# Patient Record
Sex: MALE | Race: WHITE | URBAN - METROPOLITAN AREA
[De-identification: names, ages, dates, MRNs, and addresses within clinical notes are randomized per-mention and may not be internally consistent; named-entity substitution may affect disease eponyms.]

---

## 2019-04-11 ENCOUNTER — OUTPATIENT (OUTPATIENT)
Dept: OUTPATIENT SERVICES | Facility: HOSPITAL | Age: 64
LOS: 1 days | End: 2019-04-11
Payer: MEDICARE

## 2019-04-11 ENCOUNTER — RESULT REVIEW (OUTPATIENT)
Age: 64
End: 2019-04-11

## 2019-04-11 DIAGNOSIS — Z86.010 PERSONAL HISTORY OF COLONIC POLYPS: ICD-10-CM

## 2019-04-11 PROCEDURE — 88312 SPECIAL STAINS GROUP 1: CPT

## 2019-04-11 PROCEDURE — 43239 EGD BIOPSY SINGLE/MULTIPLE: CPT

## 2019-04-11 PROCEDURE — 88312 SPECIAL STAINS GROUP 1: CPT | Mod: 26

## 2019-04-11 PROCEDURE — 45380 COLONOSCOPY AND BIOPSY: CPT | Mod: PT

## 2019-04-11 PROCEDURE — 88305 TISSUE EXAM BY PATHOLOGIST: CPT | Mod: 26

## 2019-04-11 PROCEDURE — 88305 TISSUE EXAM BY PATHOLOGIST: CPT

## 2019-04-15 LAB — SURGICAL PATHOLOGY STUDY: SIGNIFICANT CHANGE UP

## 2020-05-20 ENCOUNTER — TRANSCRIPTION ENCOUNTER (OUTPATIENT)
Age: 65
End: 2020-05-20

## 2020-05-28 ENCOUNTER — APPOINTMENT (OUTPATIENT)
Dept: OTOLARYNGOLOGY | Facility: CLINIC | Age: 65
End: 2020-05-28
Payer: MEDICARE

## 2020-05-28 VITALS
TEMPERATURE: 98.1 F | HEART RATE: 76 BPM | BODY MASS INDEX: 25.33 KG/M2 | DIASTOLIC BLOOD PRESSURE: 99 MMHG | SYSTOLIC BLOOD PRESSURE: 185 MMHG | WEIGHT: 187 LBS | HEIGHT: 72 IN

## 2020-05-28 PROCEDURE — 99204 OFFICE O/P NEW MOD 45 MIN: CPT | Mod: 25

## 2020-05-28 PROCEDURE — 92557 COMPREHENSIVE HEARING TEST: CPT

## 2020-05-28 PROCEDURE — 92567 TYMPANOMETRY: CPT

## 2020-05-28 NOTE — REVIEW OF SYSTEMS
[Ear Pain] : ear pain [Dizziness] : dizziness [Ear Itch] : ear itch [Vertigo] : vertigo [Ear Noises] : ear noises [Lightheadedness] : lightheadedness [Nasal Congestion] : nasal congestion [Sinus Pain] : sinus pain [Problem Snoring] : problem snoring [Sinus Pressure] : sinus pressure [Throat Dryness] : throat dryness [Snoring With Pauses] : snoring with pauses [Throat Itching] : throat itching [Eye Pain] : eye pain [Heartburn] : heartburn [Eyes Itch] : itching of the eyes [Negative] : Heme/Lymph

## 2020-05-28 NOTE — HISTORY OF PRESENT ILLNESS
[de-identified] : c/o problem with balance for past one week.  No spinning .  Felt off balance.  Seen at [facility] and admitted and discharged one day later. Had MRI - non contrast  and MRA of neck - no contrast - told negative.  Patient improving now.  No blackout or diplopia.  Also has some difficulty with vision - worse in left eye.  Occ feels flutter in ear on left.  No change in hearing.  No hx of chronic ear problems.  No recent URI.  Neg eval for COVID. \par No neuro eval.  Cardiac eval 2 years ago.  Does have elevated BP.  Patient also is scheduled for MRA of brain in near future.  He also has hx of occ severe headaches.

## 2020-05-28 NOTE — ASSESSMENT
[FreeTextEntry1] : Patient with recent onset of balance problems one week ago.   Eval to date negative including non contrast MRI and MRA of neck.  Does have hx of migraines in past.  Exam unremarkable and audio shows symmetric hfsnhl.   Feel problem may be vestibular neuronitis but may be migraine variant.  Also would have patient evaluated for elevated BP and recommended cardiac and neurology eval.  Follow up in 3 mos and as necessary.

## 2020-05-30 ENCOUNTER — APPOINTMENT (OUTPATIENT)
Dept: OTOLARYNGOLOGY | Facility: CLINIC | Age: 65
End: 2020-05-30
Payer: MEDICARE

## 2020-05-30 VITALS
SYSTOLIC BLOOD PRESSURE: 128 MMHG | BODY MASS INDEX: 25.33 KG/M2 | HEART RATE: 73 BPM | DIASTOLIC BLOOD PRESSURE: 84 MMHG | HEIGHT: 72 IN | WEIGHT: 187 LBS | TEMPERATURE: 98.6 F

## 2020-05-30 DIAGNOSIS — H90.3 SENSORINEURAL HEARING LOSS, BILATERAL: ICD-10-CM

## 2020-05-30 PROCEDURE — 99214 OFFICE O/P EST MOD 30 MIN: CPT

## 2020-06-01 PROBLEM — H90.3 SENSORINEURAL HEARING LOSS (SNHL) OF BOTH EARS: Status: ACTIVE | Noted: 2020-05-28

## 2020-06-01 NOTE — HISTORY OF PRESENT ILLNESS
[de-identified] : 64yo man with acute onset vertigo\par happened several wks ago\par nausea, vomiting, spinning\par went to ER\par had MRI, which was normal\par no changes to hearing, tinnitus, ear fullness\par did not have sx like this before\par has been on PO steroids

## 2020-06-01 NOTE — PHYSICAL EXAM
[Hearing Loss Right Only] : normal [Rinne Test Air Conduction Persists > Bone Conduction Right] : air conduction greater than bone conduction on the right [Hearing Loss Left Only] : normal [Rinne Test Air Conduction Persists > Bone Conduction Left] : air conduction greater than bone conduction on the left [Hearing Sy Test (Tuning Fork On Forehead)] : no lateralization of tone [Nystagmus] : ~T ~M nystagmus was seen [Fukuda Step Test] : Fukuda Step Test was Positive [Romberg's Sign] : Romberg's sign was absent [Fistula Sign] : Fistula Sign: Positive [Past-Pointing] : Past-Pointing: Negative [FreeTextEntry1] : +HIT to L\par R nystagmus [Midline] : trachea located in midline position [Normal] : orientation to person, place, and time: normal

## 2020-06-15 ENCOUNTER — APPOINTMENT (OUTPATIENT)
Dept: NEUROLOGY | Facility: CLINIC | Age: 65
End: 2020-06-15
Payer: MEDICARE

## 2020-06-15 VITALS
DIASTOLIC BLOOD PRESSURE: 89 MMHG | HEIGHT: 72 IN | BODY MASS INDEX: 25.33 KG/M2 | WEIGHT: 187 LBS | SYSTOLIC BLOOD PRESSURE: 150 MMHG | HEART RATE: 64 BPM

## 2020-06-15 VITALS — HEART RATE: 76 BPM | DIASTOLIC BLOOD PRESSURE: 81 MMHG | SYSTOLIC BLOOD PRESSURE: 148 MMHG

## 2020-06-15 VITALS — TEMPERATURE: 94.8 F

## 2020-06-15 DIAGNOSIS — Z82.49 FAMILY HISTORY OF ISCHEMIC HEART DISEASE AND OTHER DISEASES OF THE CIRCULATORY SYSTEM: ICD-10-CM

## 2020-06-15 DIAGNOSIS — G43.109 MIGRAINE WITH AURA, NOT INTRACTABLE, W/OUT STATUS MIGRAINOSUS: ICD-10-CM

## 2020-06-15 DIAGNOSIS — Z80.3 FAMILY HISTORY OF MALIGNANT NEOPLASM OF BREAST: ICD-10-CM

## 2020-06-15 PROCEDURE — 99205 OFFICE O/P NEW HI 60 MIN: CPT

## 2020-06-15 RX ORDER — AMLODIPINE BESYLATE 5 MG/1
5 TABLET ORAL
Qty: 30 | Refills: 0 | Status: ACTIVE | COMMUNITY
Start: 2020-05-28

## 2020-06-15 RX ORDER — OMEPRAZOLE 20 MG/1
20 CAPSULE, DELAYED RELEASE ORAL
Refills: 0 | Status: ACTIVE | COMMUNITY

## 2020-06-15 RX ORDER — ELECTROLYTES/DEXTROSE
SOLUTION, ORAL ORAL DAILY
Qty: 30 | Refills: 0 | Status: ACTIVE | COMMUNITY
Start: 2020-06-15

## 2020-06-15 RX ORDER — ATORVASTATIN CALCIUM 40 MG/1
40 TABLET, FILM COATED ORAL
Refills: 0 | Status: ACTIVE | COMMUNITY

## 2020-06-15 RX ORDER — OMEGA-3/DHA/EPA/FISH OIL 300-1000MG
1000 CAPSULE ORAL
Qty: 30 | Refills: 0 | Status: ACTIVE | COMMUNITY
Start: 2020-06-15

## 2020-06-15 NOTE — DATA REVIEWED
[de-identified] : MRA Brain (6/1/20):\par - No intracranial vascular abnormalities\par - Incidental paranasal sinus disease\par \par Audiology (5/28/20):\par - Moderate/Severe high-frequency sensorineural hearing loss b/l

## 2020-06-15 NOTE — PHYSICAL EXAM
[General Appearance - Alert] : alert [General Appearance - In No Acute Distress] : in no acute distress [General Appearance - Well Nourished] : well nourished [General Appearance - Well Developed] : well developed [Oriented To Time, Place, And Person] : oriented to person, place, and time [Impaired Insight] : insight and judgment were intact [Affect] : the affect was normal [Place] : oriented to place [Person] : oriented to person [Time] : oriented to time [Concentration Intact] : normal concentrating ability [Visual Intact] : visual attention was ~T not ~L decreased [Repeating Phrases] : no difficulty repeating a phrase [Naming Objects] : no difficulty naming common objects [Comprehension] : comprehension intact [Fluency] : fluency intact [Cranial Nerves Optic (II)] : visual acuity intact bilaterally,  visual fields full to confrontation, pupils equal round and reactive to light [Cranial Nerves Facial (VII)] : face symmetrical [Cranial Nerves Oculomotor (III)] : extraocular motion intact [Cranial Nerves Trigeminal (V)] : facial sensation intact symmetrically [Cranial Nerves Vestibulocochlear (VIII)] : hearing was intact bilaterally [Cranial Nerves Glossopharyngeal (IX)] : tongue and palate midline [Cranial Nerves Accessory (XI - Cranial And Spinal)] : head turning and shoulder shrug symmetric [Motor Strength] : muscle strength was normal in all four extremities [Cranial Nerves Hypoglossal (XII)] : there was no tongue deviation with protrusion [Motor Handedness Left-Handed] : the patient is left hand dominant [No Muscle Atrophy] : normal bulk in all four extremities [Motor Strength Upper Extremities Bilaterally] : strength was normal in both upper extremities [Paresis Pronator Drift Left-Sided] : no pronator drift on the left [Paresis Pronator Drift Right-Sided] : no pronator drift on the right [Sensation Tactile Decrease] : light touch was intact [Romberg's Sign] : Romberg's sign was negtive [Motor Strength Lower Extremities Bilaterally] : strength was normal in both lower extremities [Sensation Pain / Temperature Decrease] : pain and temperature was intact [Past-pointing] : there was no past-pointing [Tremor] : no tremor present [Dysdiadochokinesia Bilaterally] : not present [Coordination - Dysmetria Impaired Finger-to-Nose Bilateral] : not present [Coordination - Dysmetria Impaired Heel-to-Shin Bilateral] : not present [2+] : Ankle jerk left 2+ [Plantar Reflex Right Only] : normal on the right [___] : absent on the right [Plantar Reflex Left Only] : normal on the left [___] : absent on the left [FreeTextEntry8] : Normal, narrow-based gait. No difficulty with heel gait. Unable to perform tiptoe gait due to lower back discomfort. Lists to left during tandem gait testing, but does not fall. [Sclera] : the sclera and conjunctiva were normal [PERRL With Normal Accommodation] : pupils were equal in size, round, reactive to light, with normal accommodation [Extraocular Movements] : extraocular movements were intact [Outer Ear] : the ears and nose were normal in appearance [Neck Appearance] : the appearance of the neck was normal [Oropharynx] : the oropharynx was normal [Respiration, Rhythm And Depth] : normal respiratory rhythm and effort [FreeTextEntry1] : No tenderness to palpation [Auscultation Breath Sounds / Voice Sounds] : lungs were clear to auscultation bilaterally [Heart Rate And Rhythm] : heart rate was normal and rhythm regular [Heart Sounds] : normal S1 and S2 [Full Pulse] : the pedal pulses are present [Arterial Pulses Carotid] : carotid pulses were normal with no bruits [Bowel Sounds] : normal bowel sounds [Edema] : there was no peripheral edema [Abdomen Tenderness] : non-tender [Abdomen Soft] : soft [No CVA Tenderness] : no ~M costovertebral angle tenderness [Musculoskeletal - Swelling] : no joint swelling seen [Nail Clubbing] : no clubbing  or cyanosis of the fingernails [No Spinal Tenderness] : no spinal tenderness [Skin Color & Pigmentation] : normal skin color and pigmentation [Skin Turgor] : normal skin turgor [Motor Tone] : muscle strength and tone were normal [] : no rash

## 2020-06-15 NOTE — ASSESSMENT
[FreeTextEntry1] : 65 LHM with gait imbalance and positive left Chesapeake-Hallpike maneuver, likely secondary to left-sided vestibular neuronitis, less likely vestibular migraine given absence of the patient's typical migraine features with current symptoms, and less likely of cardiovascular origin given absence of orthostatic hypotension or chest discomfort.

## 2020-06-15 NOTE — HISTORY OF PRESENT ILLNESS
[FreeTextEntry1] : This is a 65-year-old left-handed man who states that on March 20, 2020, while using the bathroom, he had a sudden-onset episode of nausea with lightheadedness and feeling off-balance, unable to walk a straight line, but without room-spinning sensation. He felt sinus congestion, which felt like a heaviness behind his forehead and his left eye, but denied any headache, vision changes, or light sensitivity or sound sensitivity. This sensation persisted during the rest of the day and the next day. He presented to [facility], where he was admitted overnight. He was noted to have SBP around 180 mmHg during that stay, even though his SBP is normally around 120 mmHg. He had a MRI and MRA of the Brain, which were reported to be normal. Because he continued to have unstable gait and elevated blood pressure upon discharge, he was provided with a walker (which he only needed to use for 2-3 days), and prescriptions for a Medrol Dose Pack and amlodipine 5mg daily. Since his discharge, he has had intermittent "fluttering" sensations in his left ear, which have decreased in frequency over the past couple of weeks. He had two evaluations by otolaryngologists since his discharge (Dr. Juice Alfaro and Dr. Xiomara Wong), with an audiology test performed, and was told he may have left vestibular neuronitis. He has also seen cardiologist, Dr. Fransisco Lindquist, in [place], [place], and was arranged for an electrocardiogram and echocardiogram, which were both reported to be normal. A cardiac stress test is planned for next week. At present, the patient's balance has improved, but he still feels that he may be drifting at times. He recalls having migraines between ages 6 and 10, and in his adult life he experiences severe headache about 1 day every 1-2 years, described as a frontal sharp pain, without any associated auditory or balance problems. He recalls having one episode as a child of decreased hearing out of the left ear between ages 4 and 6, but this was diagnosed as a "wax buildup," and his doctor treated it by cleaning the area with a solution.

## 2020-07-07 ENCOUNTER — APPOINTMENT (OUTPATIENT)
Dept: OTOLARYNGOLOGY | Facility: CLINIC | Age: 65
End: 2020-07-07
Payer: MEDICARE

## 2020-07-07 VITALS
WEIGHT: 187 LBS | TEMPERATURE: 98.8 F | HEIGHT: 72 IN | DIASTOLIC BLOOD PRESSURE: 78 MMHG | SYSTOLIC BLOOD PRESSURE: 142 MMHG | BODY MASS INDEX: 25.33 KG/M2 | HEART RATE: 66 BPM

## 2020-07-07 DIAGNOSIS — H81.20 VESTIBULAR NEURONITIS, UNSPECIFIED EAR: ICD-10-CM

## 2020-07-07 DIAGNOSIS — R26.81 UNSTEADINESS ON FEET: ICD-10-CM

## 2020-07-07 DIAGNOSIS — R42 DIZZINESS AND GIDDINESS: ICD-10-CM

## 2020-07-07 PROCEDURE — 99213 OFFICE O/P EST LOW 20 MIN: CPT

## 2020-07-07 NOTE — PHYSICAL EXAM
[Hearing Loss Right Only] : normal [Rinne Test Air Conduction Persists > Bone Conduction Right] : air conduction greater than bone conduction on the right [Hearing Loss Left Only] : normal [Rinne Test Air Conduction Persists > Bone Conduction Left] : air conduction greater than bone conduction on the left [Hearing Sy Test (Tuning Fork On Forehead)] : no lateralization of tone [Fukuda Step Test] : Fukuda Step Test was Positive [Fistula Sign] : Fistula Sign: Negative [Romberg's Sign] : Romberg's sign was absent [Past-Pointing] : Past-Pointing: Negative [FreeTextEntry1] : +alex PARKS [Midline] : trachea located in midline position [Normal] : no rashes

## 2020-07-07 NOTE — HISTORY OF PRESENT ILLNESS
[de-identified] : f/u VN\par better bus still with residual imbalance and dizziness\par not very severe, and short lasting\par no changes in hearing

## 2020-07-14 ENCOUNTER — APPOINTMENT (OUTPATIENT)
Dept: OTOLARYNGOLOGY | Facility: CLINIC | Age: 65
End: 2020-07-14

## 2021-07-02 NOTE — PHYSICAL EXAM
PT INFORMED   SAMPLES GIVEN     [Midline] : trachea located in midline position [Normal] : no rashes

## 2023-04-27 ENCOUNTER — TRANSCRIPTION ENCOUNTER (OUTPATIENT)
Age: 68
End: 2023-04-27

## 2023-04-27 ENCOUNTER — APPOINTMENT (OUTPATIENT)
Dept: ORTHOPEDIC SURGERY | Facility: CLINIC | Age: 68
End: 2023-04-27
Payer: COMMERCIAL

## 2023-04-27 VITALS — HEIGHT: 72 IN | WEIGHT: 186 LBS | BODY MASS INDEX: 25.19 KG/M2

## 2023-04-27 DIAGNOSIS — M50.10 CERVICAL DISC DISORDER WITH RADICULOPATHY, UNSPECIFIED CERVICAL REGION: ICD-10-CM

## 2023-04-27 DIAGNOSIS — I10 ESSENTIAL (PRIMARY) HYPERTENSION: ICD-10-CM

## 2023-04-27 DIAGNOSIS — M79.18 MYALGIA, OTHER SITE: ICD-10-CM

## 2023-04-27 DIAGNOSIS — M50.90 CERVICAL DISC DISORDER, UNSPECIFIED, UNSPECIFIED CERVICAL REGION: ICD-10-CM

## 2023-04-27 DIAGNOSIS — E78.00 PURE HYPERCHOLESTEROLEMIA, UNSPECIFIED: ICD-10-CM

## 2023-04-27 PROCEDURE — 99204 OFFICE O/P NEW MOD 45 MIN: CPT

## 2023-04-27 PROCEDURE — 73030 X-RAY EXAM OF SHOULDER: CPT | Mod: RT

## 2023-04-27 PROCEDURE — 73010 X-RAY EXAM OF SHOULDER BLADE: CPT | Mod: RT

## 2023-04-27 RX ORDER — METHYLPREDNISOLONE 4 MG/1
4 TABLET ORAL
Qty: 1 | Refills: 0 | Status: ACTIVE | COMMUNITY
Start: 2023-04-27 | End: 1900-01-01
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
## 2023-04-27 NOTE — IMAGING
[de-identified] : NECK:\par Inspection: no ecchymosis. \par Palpation: trapezial tenderness. \par Range of motion:  Full range of motion with mild stiffness . Pain at extremes of rotation to right. \par Strength Testing: motor exam is non-focal throughout both lower extremities\par Normal Deltoid, Biceps, Triceps, Wrist Flexors, Finger Abductors, Grasp \par Neurological testing: light touch is intact throughout both upper extremities\par Chester reflex: neg\par Spurling test: neg\par \par \par RIGHT SHOULDER\par Inspection: No swelling. \par Palpation: Tenderness is noted at the bicipital groove, anterior and lateral. \par Range of motion: There is pain with range of motion.\par , ER 55, @90ER 90, @90IR 30\par Strength: There is pain and discomfort with strength testing.\par Forward Flexion 4/5. Abduction 4/5.  External Rotation 5-/5 and Internal Rotation 5/5 \par Neurological testings: motor and sensor intact distally.\par Ligament Stability and Special Tests: \par There is positive arc of pain. \par Shoulder apprehension: neg\par Shoulder relocation: neg\par Hinojosa’s test: pos\par Biceps Active test: neg\par Simpson Labral Shear: neg\par Impingement testing: pos\par Renita testing: pos\par Whipple: pos\par Cross Body Adduction: neg\par \par

## 2023-04-27 NOTE — ASSESSMENT
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 \par X-Ray Examination of the SCAPULA 1 or 2 views shows: No significant abnormalities.  Acromial spur. \par \par \par - We discussed their diagnosis and treatment options at length including the risks and benefits of both surgical and non-surgical options.\par - We will conservative treatment with a course of PT and anti-inflammatory medication.\par - Rx given for Medrol dose pack\par - Discussed the possible side effects of medication along with the timing and frequency for taking.\par - If they do not make an appropriate improvement with therapy we discussed that we will obtain and MRI at their next visit.\par \par \par \par

## 2024-10-29 ENCOUNTER — APPOINTMENT (OUTPATIENT)
Dept: OTOLARYNGOLOGY | Facility: CLINIC | Age: 69
End: 2024-10-29

## 2024-10-29 ENCOUNTER — APPOINTMENT (OUTPATIENT)
Dept: OTOLARYNGOLOGY | Facility: CLINIC | Age: 69
End: 2024-10-29
Payer: MEDICARE

## 2024-10-29 ENCOUNTER — NON-APPOINTMENT (OUTPATIENT)
Age: 69
End: 2024-10-29

## 2024-10-29 VITALS
SYSTOLIC BLOOD PRESSURE: 104 MMHG | DIASTOLIC BLOOD PRESSURE: 68 MMHG | HEART RATE: 77 BPM | HEIGHT: 72 IN | WEIGHT: 187 LBS | BODY MASS INDEX: 25.33 KG/M2

## 2024-10-29 DIAGNOSIS — J34.89 OTHER SPECIFIED DISORDERS OF NOSE AND NASAL SINUSES: ICD-10-CM

## 2024-10-29 PROCEDURE — 99203 OFFICE O/P NEW LOW 30 MIN: CPT | Mod: 25

## 2024-10-29 PROCEDURE — 31231 NASAL ENDOSCOPY DX: CPT

## 2024-10-29 RX ORDER — FLUTICASONE PROPIONATE 50 UG/1
50 SPRAY, METERED NASAL
Qty: 1 | Refills: 0 | Status: ACTIVE | COMMUNITY
Start: 2024-10-29 | End: 1900-01-01

## 2024-11-13 ENCOUNTER — APPOINTMENT (OUTPATIENT)
Dept: CT IMAGING | Facility: CLINIC | Age: 69
End: 2024-11-13

## 2024-11-13 ENCOUNTER — OUTPATIENT (OUTPATIENT)
Dept: OUTPATIENT SERVICES | Facility: HOSPITAL | Age: 69
LOS: 1 days | End: 2024-11-13
Payer: COMMERCIAL

## 2024-11-13 DIAGNOSIS — Z00.8 ENCOUNTER FOR OTHER GENERAL EXAMINATION: ICD-10-CM

## 2024-11-13 PROCEDURE — 70486 CT MAXILLOFACIAL W/O DYE: CPT | Mod: 26

## 2024-11-13 PROCEDURE — 70486 CT MAXILLOFACIAL W/O DYE: CPT

## 2024-11-26 ENCOUNTER — APPOINTMENT (OUTPATIENT)
Dept: OTOLARYNGOLOGY | Facility: CLINIC | Age: 69
End: 2024-11-26
Payer: MEDICARE

## 2024-11-26 VITALS
DIASTOLIC BLOOD PRESSURE: 83 MMHG | WEIGHT: 193.5 LBS | HEIGHT: 72 IN | SYSTOLIC BLOOD PRESSURE: 133 MMHG | HEART RATE: 69 BPM | BODY MASS INDEX: 26.21 KG/M2

## 2024-11-26 DIAGNOSIS — J31.0 CHRONIC RHINITIS: ICD-10-CM

## 2024-11-26 DIAGNOSIS — J32.9 CHRONIC SINUSITIS, UNSPECIFIED: ICD-10-CM

## 2024-11-26 DIAGNOSIS — K21.9 GASTRO-ESOPHAGEAL REFLUX DISEASE W/OUT ESOPHAGITIS: ICD-10-CM

## 2024-11-26 PROCEDURE — 31231 NASAL ENDOSCOPY DX: CPT

## 2024-11-26 PROCEDURE — 99213 OFFICE O/P EST LOW 20 MIN: CPT | Mod: 25

## 2024-11-26 RX ORDER — AZELASTINE HYDROCHLORIDE 137 UG/1
0.1 SPRAY, METERED NASAL
Qty: 1 | Refills: 4 | Status: ACTIVE | COMMUNITY
Start: 2024-11-26 | End: 1900-01-01

## 2024-12-24 ENCOUNTER — APPOINTMENT (OUTPATIENT)
Dept: OTOLARYNGOLOGY | Facility: CLINIC | Age: 69
End: 2024-12-24

## 2024-12-24 ENCOUNTER — RX RENEWAL (OUTPATIENT)
Age: 69
End: 2024-12-24

## 2025-02-27 RX ORDER — FLUTICASONE PROPIONATE 50 UG/1
50 SPRAY, METERED NASAL DAILY
Qty: 1 | Refills: 2 | Status: ACTIVE | COMMUNITY
Start: 2025-02-27 | End: 1900-01-01

## 2025-07-09 ENCOUNTER — NON-APPOINTMENT (OUTPATIENT)
Age: 70
End: 2025-07-09

## 2025-07-09 ENCOUNTER — APPOINTMENT (OUTPATIENT)
Dept: OTOLARYNGOLOGY | Facility: CLINIC | Age: 70
End: 2025-07-09
Payer: MEDICARE

## 2025-07-09 VITALS
WEIGHT: 192 LBS | SYSTOLIC BLOOD PRESSURE: 149 MMHG | HEART RATE: 70 BPM | BODY MASS INDEX: 26.01 KG/M2 | HEIGHT: 72 IN | DIASTOLIC BLOOD PRESSURE: 92 MMHG

## 2025-07-09 PROCEDURE — 99214 OFFICE O/P EST MOD 30 MIN: CPT | Mod: 25

## 2025-07-09 PROCEDURE — 31231 NASAL ENDOSCOPY DX: CPT

## 2025-07-17 ENCOUNTER — OUTPATIENT (OUTPATIENT)
Dept: OUTPATIENT SERVICES | Facility: HOSPITAL | Age: 70
LOS: 1 days | End: 2025-07-17
Payer: COMMERCIAL

## 2025-07-17 ENCOUNTER — APPOINTMENT (OUTPATIENT)
Dept: CT IMAGING | Facility: CLINIC | Age: 70
End: 2025-07-17

## 2025-07-17 DIAGNOSIS — J32.9 CHRONIC SINUSITIS, UNSPECIFIED: ICD-10-CM

## 2025-07-17 PROCEDURE — 70486 CT MAXILLOFACIAL W/O DYE: CPT

## 2025-07-17 PROCEDURE — 70486 CT MAXILLOFACIAL W/O DYE: CPT | Mod: 26

## 2025-08-07 ENCOUNTER — APPOINTMENT (OUTPATIENT)
Dept: OTOLARYNGOLOGY | Facility: CLINIC | Age: 70
End: 2025-08-07
Payer: MEDICARE

## 2025-08-07 VITALS
SYSTOLIC BLOOD PRESSURE: 152 MMHG | DIASTOLIC BLOOD PRESSURE: 88 MMHG | HEIGHT: 72 IN | HEART RATE: 70 BPM | WEIGHT: 187 LBS | BODY MASS INDEX: 25.33 KG/M2

## 2025-08-07 DIAGNOSIS — J33.9 NASAL POLYP, UNSPECIFIED: ICD-10-CM

## 2025-08-07 DIAGNOSIS — J31.0 CHRONIC RHINITIS: ICD-10-CM

## 2025-08-07 PROCEDURE — 31231 NASAL ENDOSCOPY DX: CPT

## 2025-08-07 PROCEDURE — 99213 OFFICE O/P EST LOW 20 MIN: CPT | Mod: 25

## 2025-08-07 RX ORDER — DOXYCYCLINE HYCLATE 100 MG/1
100 CAPSULE ORAL
Qty: 28 | Refills: 3 | Status: ACTIVE | COMMUNITY
Start: 2025-08-07 | End: 1900-01-01

## 2025-08-07 RX ORDER — CETIRIZINE HYDROCHLORIDE 10 MG/1
10 TABLET, COATED ORAL
Qty: 30 | Refills: 1 | Status: ACTIVE | COMMUNITY
Start: 2025-08-07 | End: 1900-01-01

## 2025-08-07 RX ORDER — FLUTICASONE PROPIONATE 50 UG/1
50 SPRAY NASAL
Qty: 16 | Refills: 3 | Status: ACTIVE | COMMUNITY
Start: 2025-08-07 | End: 1900-01-01

## 2025-08-07 RX ORDER — AZELASTINE HYDROCHLORIDE 137 UG/1
0.1 SPRAY, METERED NASAL
Qty: 1 | Refills: 4 | Status: ACTIVE | COMMUNITY
Start: 2025-08-07 | End: 1900-01-01

## 2025-08-29 ENCOUNTER — TRANSCRIPTION ENCOUNTER (OUTPATIENT)
Age: 70
End: 2025-08-29